# Patient Record
Sex: FEMALE | Race: WHITE | URBAN - METROPOLITAN AREA
[De-identification: names, ages, dates, MRNs, and addresses within clinical notes are randomized per-mention and may not be internally consistent; named-entity substitution may affect disease eponyms.]

---

## 2020-03-12 ENCOUNTER — OFFICE VISIT (OUTPATIENT)
Dept: DERMATOLOGY | Age: 60
End: 2020-03-12
Payer: COMMERCIAL

## 2020-03-12 PROBLEM — L82.1 SEBORRHEIC KERATOSIS: Status: ACTIVE | Noted: 2020-03-12

## 2020-03-12 PROBLEM — D22.9 MULTIPLE BENIGN MELANOCYTIC NEVI: Status: ACTIVE | Noted: 2020-03-12

## 2020-03-12 PROBLEM — Z12.83 SKIN CANCER SCREENING: Status: ACTIVE | Noted: 2020-03-12

## 2020-03-12 PROCEDURE — 99203 OFFICE O/P NEW LOW 30 MIN: CPT | Performed by: DERMATOLOGY

## 2020-03-12 RX ORDER — LEVOTHYROXINE SODIUM 88 UG/1
TABLET ORAL
COMMUNITY
Start: 2020-01-08

## 2020-03-12 NOTE — PATIENT INSTRUCTIONS
Please be mindful of your sun protection strategies, including use of broad spectrum sunscreen with SPF of at least 30, sun guard clothing with UPF (available at most sporting [employer] [place] stores), broad brimmed hat, sunglasses, and sun avoidance during peak hours of the day. ABCDE's of melanoma to take note of:     ASYMMETRY OF MOLE,   BORDER STRANGE,   COLOR CHANGING OR BECOMING DARKER,   DIAMETER ENLARGING,   EVOLUTION (EXISTING MOLE IS CHANGING WITH ANY OF THE AFOREMENTIONED CHANGES, MOLE IS BLEEDING OR TENDER, OR NEW MOLES DEVELOPING SUDDENLY)    Also, please be sure to see dentist twice yearly and ensure someone (whether it's me,  your PCP, or gynecologist) is looking at genitals once yearly. Melanoma and other skin cancers can occur anywhere! Please email me through [place] or call office if any spot or mole changes, defined as becoming larger, darker, multi-colored, or becomes symptomatic (bleeding, tender, etc).

## 2020-03-12 NOTE — PROGRESS NOTES
melanocytic macules and papules  2.)  Torso and extremities with several scattered tan to skin colored ovoid scaly stuck on thin papules and small plaques      ASSESSMENT AND PLAN:    1.) Multiple scattered acquired melanocytic nevi with banal features:   - Reassurance  - Edu: patient regarding sun protection strategies, including use of broad spectrum sunscreen with SPF of at least 30, sun guard clothing, broad brimmed hat, sunglasses, and sun avoidance during peak hours of the day. ABCDE's of melanoma also reviewed     2.)  SKs:  - Benign, reassurance; no tx necessary unless symptomatic or for cosmesis    Return to Clinic:  1 yr and prn changes  Discussed plan with patient and/or primary caretaker. Patient to call clinic with any questions / concerns. Reviewed side effects of treatment(s) and/or medication(s) with patient and/or primary caretaker.    AVS provided or is available on [employer]   ____________________________________________________________________________   Arthurine MD Rom, MPH, FAAD  [facility], [place]

## 2020-04-11 PROBLEM — Z12.83 SKIN CANCER SCREENING: Status: RESOLVED | Noted: 2020-03-12 | Resolved: 2020-04-11
